# Patient Record
Sex: FEMALE | Race: WHITE | Employment: UNEMPLOYED | URBAN - METROPOLITAN AREA
[De-identification: names, ages, dates, MRNs, and addresses within clinical notes are randomized per-mention and may not be internally consistent; named-entity substitution may affect disease eponyms.]

---

## 2021-07-14 ENCOUNTER — TRANSCRIBE ORDER (OUTPATIENT)
Dept: SCHEDULING | Age: 56
End: 2021-07-14

## 2021-07-14 DIAGNOSIS — N95.0 POSTMENOPAUSAL BLEEDING: Primary | ICD-10-CM

## 2021-07-16 ENCOUNTER — HOSPITAL ENCOUNTER (OUTPATIENT)
Dept: ULTRASOUND IMAGING | Age: 56
Discharge: HOME | End: 2021-07-16
Attending: OBSTETRICS & GYNECOLOGY
Payer: COMMERCIAL

## 2021-07-16 DIAGNOSIS — N95.0 POSTMENOPAUSAL BLEEDING: ICD-10-CM

## 2021-07-16 PROCEDURE — 76830 TRANSVAGINAL US NON-OB: CPT

## 2021-07-16 PROCEDURE — 76700 US EXAM ABDOM COMPLETE: CPT

## 2021-07-16 PROCEDURE — 76856 US EXAM PELVIC COMPLETE: CPT

## 2022-07-10 NOTE — PROGRESS NOTES
CARDIOLOGY OFFICE NOTE    Leroy Shields MD, [address], [place], [address] [facility]  Phone [phone]; Fax [phone]  Mobile [phone]   Voice Mail [phone]    Primary care: None       ATTENTION:   This medical record was transcribed using an electronic medical records/speech recognition system. Although proofread, it may and can contain electronic, spelling and other errors. Corrections may be executed at a later time. Please feel free to contact us for any clarifications as needed. ICD-10-CM ICD-9-CM   1. Annual physical exam  Z00.00 V70.0            Claudene Elm is a 60 y.o. female with  referred for dyslipidemia          Cardiac risk factors: family history, dyslipidemia, post-menopausal  I have personally obtained the history from the patient. HISTORY OF PRESENTING ILLNESS    She moved here from [place]. She is here to get meds refilled. She had echo and cardiac MRI self reported. She does not have any chest pain or shortness of breath. Says she had calcium scoring about 3 years ago was 0. She is currently moving to [facility] and is in the process of building a house and and did get refills on her medicine. I did asked that she get a cholesterol profile. There is a family history of her brother having a hole in his heart as of her grandfather as well. She had testing done in the past that did not demonstrate any evidence of this. She was seen in 2019 with episode of syncope at the [address] [place] [address]. ACTIVE PROBLEM LIST     There are no problems to display for this patient. PAST MEDICAL HISTORY     Past Medical History:   Diagnosis Date    High cholesterol 2015    Menopausal disorder 2017           PAST SURGICAL HISTORY     History reviewed. No pertinent surgical history.        ALLERGIES     Allergies   Allergen Reactions    Codeine Itching          FAMILY HISTORY     History reviewed. No pertinent family history. negative for cardiac disease       SOCIAL HISTORY     Social History     Socioeconomic History    Marital status:    Tobacco Use    Smoking status: Never Smoker    Smokeless tobacco: Never Used   Substance and Sexual Activity    Alcohol use: Yes     Comment: 3-4 drinks per week of all listed aboved    Drug use: Never         MEDICATIONS     Current Outpatient Medications   Medication Sig    atorvastatin (LIPITOR) 40 mg tablet Take 40 mg by mouth daily.  estradioL (ESTRACE) 0.5 mg tablet Take 0.5 mg by mouth daily.  progesterone (PROMETRIUM) 200 mg capsule Take 200 mg by mouth daily. No current facility-administered medications for this visit. I have reviewed the nurses notes, vitals, problem list, allergy list, medical history, family, social history and medications. REVIEW OF SYMPTOMS    as per HPI  General: Pt denies excessive weight gain or loss. Pt is able to conduct ADL's  HEENT: Denies blurred vision, headaches, hearing loss, epistaxis and difficulty swallowing. Respiratory: Denies cough, congestion, shortness of breath, DOUGLAS, wheezing or stridor. Cardiovascular: Denies precordial pain, palpitations, edema or PND  Gastrointestinal: Denies poor appetite, indigestion, abdominal pain or blood in stool  Genitourinary: Denies hematuria, dysuria, increased urinary frequency  Musculoskeletal: Denies joint pain or swelling from muscles or joints  Neurologic: Denies tremor, paresthesias, headache, or sensory motor disturbance  Psychiatric: Denies confusion, insomnia, depression  Integumentray: Denies rash, itching or ulcers. Hematologic: Denies easy bruising, bleeding     PHYSICAL EXAMINATION      Vitals:    07/11/22 1201   BP: 114/68   Pulse: 68   SpO2: 98%   Weight: 120 lb (54.4 kg)   Height: 5' 4\" (1.626 m)     General: Well developed, in no acute distress.   HEENT: No jaundice, oral mucosa moist, no oral ulcers  Neck: Supple, no stiffness, no lymphadenopathy, supple  Heart:  Normal S1/S2 negative S3 or S4. Regular, no murmur, gallop or rub, no jugular venous distention  Respiratory: Clear bilaterally x 4, no wheezing or rales  Abdomen:   Soft, non-tender, bowel sounds are active. Extremities:  No edema, normal cap refill, no cyanosis. Musculoskeletal: No clubbing, no deformities  Neuro: A&Ox3, speech clear, gait stable, cooperative, no focal neurologic deficits  Skin: Skin color is normal. No rashes or lesions. Non diaphoretic, moist.        EK22: NSR     DIAGNOSTIC DATA     1) Cholesterol  2021: , HDL 80,     2) Calcium scoring  3 years ago reported to be zero         LABORATORY DATA          No results found for: WBC, HGBPOC, HGB, HGBP, HCTPOC, HCT, PHCT, RBCH, PLT, MCV, HGBEXT, HCTEXT, PLTEXT, HGBEXT, HCTEXT, PLTEXT   No results found for: NA, K, CL, CO2, AGAP, GLU, BUN, CREA, BUCR, GFRAA, GFRNA, CA, TBIL, TBILI, AP, TP, ALB, GLOB, AGRAT, ALT        ASSESSMENT/RECOMMENDATIONS:.      1. Family Hx of CAD  -Work on risk factor modification  -Encouraged exercise eating healthy reducing red meat  2. Dyslipidemia  -Refilled lipitor for 1 year  -Should get her cholesterol checked in neck several weeks I gave her a lab requisition  -If her LDL remains above 100 would consider placing her Crestor  3. She will follow up with [facility] [place] when moves    Orders Placed This Encounter    AMB POC EKG ROUTINE W/ 12 LEADS, INTER & REP     Order Specific Question:   Reason for Exam:     Answer:   annual    atorvastatin (LIPITOR) 40 mg tablet     Sig: Take 40 mg by mouth daily.  estradioL (ESTRACE) 0.5 mg tablet     Sig: Take 0.5 mg by mouth daily.  progesterone (PROMETRIUM) 200 mg capsule     Sig: Take 200 mg by mouth daily. We discussed the expected course, resolution and complications of the diagnosis(es) in detail. Medication risks, benefits, costs, interactions, and alternatives were discussed as indicated.   I advised him to contact the office if his condition worsens, changes or fails to improve as anticipated. He expressed understanding with the diagnosis(es) and plan          Follow-up and Dispositions  ·   Return in about 8 weeks (around 9/5/2022). I have discussed the diagnosis with  Myrna Garcia and the intended plan as seen in the above orders. Questions were answered concerning future plans. I have discussed medication side effects and warnings with the patient as well. Thank you,  None for involving me in the care of  Myrna Garcia. Please do not hesitate to contact me for further questions/concerns. Leroy Fry MD, [facility] [address]      [address] [place] [address], [address] [place] [address] [facility]      [phone] / [phone] Fax

## 2022-07-10 NOTE — PATIENT INSTRUCTIONS

## 2022-07-11 ENCOUNTER — OFFICE VISIT (OUTPATIENT)
Dept: CARDIOLOGY CLINIC | Age: 57
End: 2022-07-11
Payer: COMMERCIAL

## 2022-07-11 VITALS
OXYGEN SATURATION: 98 % | HEIGHT: 64 IN | BODY MASS INDEX: 20.49 KG/M2 | SYSTOLIC BLOOD PRESSURE: 114 MMHG | DIASTOLIC BLOOD PRESSURE: 68 MMHG | HEART RATE: 68 BPM | WEIGHT: 120 LBS

## 2022-07-11 DIAGNOSIS — Z00.00 ANNUAL PHYSICAL EXAM: Primary | ICD-10-CM

## 2022-07-11 PROCEDURE — 99204 OFFICE O/P NEW MOD 45 MIN: CPT | Performed by: SPECIALIST

## 2022-07-11 PROCEDURE — 93000 ELECTROCARDIOGRAM COMPLETE: CPT | Performed by: SPECIALIST

## 2022-07-11 RX ORDER — ATORVASTATIN CALCIUM 40 MG/1
40 TABLET, FILM COATED ORAL DAILY
Qty: 90 TABLET | Refills: 4 | Status: SHIPPED | OUTPATIENT
Start: 2022-07-11

## 2022-07-11 RX ORDER — ATORVASTATIN CALCIUM 40 MG/1
40 TABLET, FILM COATED ORAL DAILY
COMMUNITY
Start: 2020-07-09 | End: 2022-07-11 | Stop reason: SDUPTHER

## 2022-07-11 RX ORDER — PROGESTERONE 200 MG/1
200 CAPSULE ORAL DAILY
COMMUNITY
Start: 2022-06-13

## 2022-07-11 RX ORDER — ESTRADIOL 0.5 MG/1
0.5 TABLET ORAL DAILY
COMMUNITY
Start: 2022-07-01

## 2022-07-11 NOTE — PROGRESS NOTES
Chief Complaint   Patient presents with    New Patient     hx of heart disease       Vitals:    07/11/22 1201   BP: 114/68   Pulse: 68   Height: 5' 4\" (1.626 m)   Weight: 120 lb (54.4 kg)   SpO2: 98%         Chest pain: no    SOB: no    Palpitations: no    Dizziness: no    Swelling: no    Refills:       1. Have you been to the ER, urgent care clinic since your last visit? Hospitalized since your last visit? no    2. Have you sen or consulted other health care providers outside of the [facility] system since your last visit? (Include any pap smears or colon screening.)     Pt stated will most likely only be coming once and then being going to [facility] to another

## 2022-07-14 LAB
ALBUMIN SERPL-MCNC: 4.8 G/DL (ref 3.8–4.9)
ALP SERPL-CCNC: 45 IU/L (ref 44–121)
ALT SERPL-CCNC: 22 IU/L (ref 0–32)
AST SERPL-CCNC: 25 IU/L (ref 0–40)
BILIRUB DIRECT SERPL-MCNC: 0.22 MG/DL (ref 0–0.4)
BILIRUB SERPL-MCNC: 0.8 MG/DL (ref 0–1.2)
CHOLEST SERPL-MCNC: 204 MG/DL (ref 100–199)
HDLC SERPL-MCNC: 80 MG/DL
LDLC SERPL CALC-MCNC: 107 MG/DL (ref 0–99)
PROT SERPL-MCNC: 7.2 G/DL (ref 6–8.5)
TRIGL SERPL-MCNC: 96 MG/DL (ref 0–149)
VLDLC SERPL CALC-MCNC: 17 MG/DL (ref 5–40)

## 2022-07-14 NOTE — PROGRESS NOTES
, Blaire Clause cholesterol numbers are not at goal. To provide you with the best heart health the LDL should be under 100 if you have no heart disease or history of diabetes. If you have a history of diabetes or heart disease the LDL goal should be less than 70. Before you make any adjustments in her medicines I would prefer you exercise and work on eating a healthy clean diet without any red meat. If you are unable to do this option is just to switch you over to Crestor. Please let me know how you feel about this.     All the best    Maria Isabel Lock

## 2023-07-21 RX ORDER — ATORVASTATIN CALCIUM 40 MG/1
TABLET, FILM COATED ORAL
Qty: 60 TABLET | Refills: 5 | OUTPATIENT
Start: 2023-07-21